# Patient Record
Sex: FEMALE | Race: WHITE | NOT HISPANIC OR LATINO | URBAN - METROPOLITAN AREA
[De-identification: names, ages, dates, MRNs, and addresses within clinical notes are randomized per-mention and may not be internally consistent; named-entity substitution may affect disease eponyms.]

---

## 2023-07-10 ENCOUNTER — OFFICE (OUTPATIENT)
Dept: URBAN - METROPOLITAN AREA CLINIC 18 | Facility: CLINIC | Age: 23
End: 2023-07-10

## 2023-07-10 VITALS
SYSTOLIC BLOOD PRESSURE: 97 MMHG | WEIGHT: 144 LBS | HEIGHT: 70 IN | DIASTOLIC BLOOD PRESSURE: 62 MMHG | OXYGEN SATURATION: 98 % | HEART RATE: 85 BPM | RESPIRATION RATE: 14 BRPM

## 2023-07-10 DIAGNOSIS — R14.0 ABDOMINAL DISTENSION (GASEOUS): ICD-10-CM

## 2023-07-10 DIAGNOSIS — K58.2 MIXED IRRITABLE BOWEL SYNDROME: ICD-10-CM

## 2023-07-10 PROCEDURE — 99244 OFF/OP CNSLTJ NEW/EST MOD 40: CPT | Performed by: INTERNAL MEDICINE

## 2023-07-10 RX ORDER — DICYCLOMINE HYDROCHLORIDE 10 MG/1
CAPSULE ORAL
Qty: 30 | Refills: 2 | Status: ACTIVE
Start: 2023-07-10

## 2023-08-23 ENCOUNTER — OFFICE (OUTPATIENT)
Dept: URBAN - METROPOLITAN AREA CLINIC 16 | Facility: CLINIC | Age: 23
End: 2023-08-23

## 2023-08-23 VITALS
WEIGHT: 146 LBS | HEART RATE: 81 BPM | HEIGHT: 70 IN | SYSTOLIC BLOOD PRESSURE: 110 MMHG | OXYGEN SATURATION: 97 % | DIASTOLIC BLOOD PRESSURE: 72 MMHG

## 2023-08-23 DIAGNOSIS — K58.2 MIXED IRRITABLE BOWEL SYNDROME: ICD-10-CM

## 2023-08-23 DIAGNOSIS — R14.0 ABDOMINAL DISTENSION (GASEOUS): ICD-10-CM

## 2023-08-23 PROCEDURE — 99213 OFFICE O/P EST LOW 20 MIN: CPT

## 2023-08-23 RX ORDER — ALPHA-D-GALACTOSIDASE
TABLET ORAL
Qty: 120 | Refills: 3 | Status: ACTIVE
Start: 2023-08-23

## 2023-08-23 NOTE — SERVICEHPINOTES
Ester Mchugh   is a 23-year-old female patient of Dr. Hill here for 6 to 8-week follow-up. Seen for consultation 7/10/2023 for chronic GI symptoms since childhood. Patient was doing well until January 2023 with increased gas and abdominal cramping. Seen for consult 7/10/23 and patient trialed lactose free diet and MiraLAX every other day. Dicyclomine Rx prescribed, patient has not trialed.not needed this yet. 
br
br Patient main concern today is continued gas/bloating. Has been paying attention to diet: Coffee, breads, vegetables have been some problematic foods. Traces of gluten and cooked dairy products nonproblematic. Having roughly 2 bowel movements per day using MiraLAX every other day, some straining (throughout childhood Miralax has been very helpful for GI symptoms). Endorses some nausea when bloated. No recent vomiting x months, melena, hematochezia, unintentional weight loss, dysphagia, tobacco and marijuana use. Max 1 alcoholic drink/week, wine. Has been on multivitamins x years. In nursing program. Reports family hx of an ANIL gene mutation in her dad. No known family hx CRC or IBD  .

## 2023-12-06 ENCOUNTER — OFFICE (OUTPATIENT)
Dept: URBAN - METROPOLITAN AREA CLINIC 18 | Facility: CLINIC | Age: 23
End: 2023-12-06

## 2023-12-06 VITALS
HEIGHT: 70 IN | WEIGHT: 146 LBS | HEART RATE: 78 BPM | SYSTOLIC BLOOD PRESSURE: 116 MMHG | DIASTOLIC BLOOD PRESSURE: 72 MMHG

## 2023-12-06 DIAGNOSIS — R14.0 ABDOMINAL DISTENSION (GASEOUS): ICD-10-CM

## 2023-12-06 DIAGNOSIS — K58.2 MIXED IRRITABLE BOWEL SYNDROME: ICD-10-CM

## 2023-12-06 PROCEDURE — 99213 OFFICE O/P EST LOW 20 MIN: CPT | Performed by: INTERNAL MEDICINE
